# Patient Record
Sex: MALE
[De-identification: names, ages, dates, MRNs, and addresses within clinical notes are randomized per-mention and may not be internally consistent; named-entity substitution may affect disease eponyms.]

---

## 2021-01-13 PROBLEM — Z00.00 ENCOUNTER FOR PREVENTIVE HEALTH EXAMINATION: Status: ACTIVE | Noted: 2021-01-13

## 2021-01-17 ENCOUNTER — APPOINTMENT (OUTPATIENT)
Dept: DISASTER EMERGENCY | Facility: CLINIC | Age: 41
End: 2021-01-17

## 2021-01-17 DIAGNOSIS — Z01.818 ENCOUNTER FOR OTHER PREPROCEDURAL EXAMINATION: ICD-10-CM

## 2021-01-18 LAB — SARS-COV-2 N GENE NPH QL NAA+PROBE: NOT DETECTED

## 2021-01-21 ENCOUNTER — APPOINTMENT (OUTPATIENT)
Dept: PULMONOLOGY | Facility: CLINIC | Age: 41
End: 2021-01-21
Payer: MEDICAID

## 2021-01-21 VITALS
BODY MASS INDEX: 27.57 KG/M2 | HEIGHT: 68.5 IN | HEART RATE: 72 BPM | TEMPERATURE: 97.7 F | WEIGHT: 184 LBS | OXYGEN SATURATION: 98 %

## 2021-01-21 VITALS
BODY MASS INDEX: 27.89 KG/M2 | TEMPERATURE: 97.2 F | RESPIRATION RATE: 15 BRPM | WEIGHT: 184 LBS | DIASTOLIC BLOOD PRESSURE: 90 MMHG | HEART RATE: 86 BPM | HEIGHT: 68 IN | SYSTOLIC BLOOD PRESSURE: 142 MMHG | OXYGEN SATURATION: 98 %

## 2021-01-21 DIAGNOSIS — R06.83 SNORING: ICD-10-CM

## 2021-01-21 DIAGNOSIS — Z78.9 OTHER SPECIFIED HEALTH STATUS: ICD-10-CM

## 2021-01-21 PROCEDURE — 99204 OFFICE O/P NEW MOD 45 MIN: CPT

## 2021-01-21 PROCEDURE — 99072 ADDL SUPL MATRL&STAF TM PHE: CPT

## 2021-01-24 PROBLEM — Z78.9 DOES NOT USE TOBACCO: Status: ACTIVE | Noted: 2021-01-24

## 2021-01-24 PROBLEM — Z78.9 SOCIAL ALCOHOL USE: Status: ACTIVE | Noted: 2021-01-24

## 2021-01-24 RX ORDER — HYDROCORTISONE 25 MG/G
2.5 CREAM TOPICAL
Qty: 30 | Refills: 0 | Status: ACTIVE | COMMUNITY
Start: 2020-12-14

## 2021-01-24 RX ORDER — LACTULOSE 10 G/15ML
10 SOLUTION ORAL
Qty: 450 | Refills: 0 | Status: ACTIVE | COMMUNITY
Start: 2020-12-14

## 2021-01-24 RX ORDER — SIMVASTATIN 20 MG/1
20 TABLET, FILM COATED ORAL
Qty: 30 | Refills: 0 | Status: ACTIVE | COMMUNITY
Start: 2021-01-13

## 2021-01-24 RX ORDER — ERGOCALCIFEROL 1.25 MG/1
1.25 MG CAPSULE, LIQUID FILLED ORAL
Qty: 4 | Refills: 0 | Status: ACTIVE | COMMUNITY
Start: 2020-10-07

## 2021-01-24 RX ORDER — FAMOTIDINE 20 MG/1
20 TABLET, FILM COATED ORAL
Qty: 30 | Refills: 0 | Status: ACTIVE | COMMUNITY
Start: 2021-01-13

## 2021-01-24 NOTE — ASSESSMENT
[FreeTextEntry1] : Mr. Sevilla is a 40 year old male with no significant pmhx who comes in for an evaluation of pulmonary nodule and sleep disordered breathing. \par \par 1. Pulmonary nodule- Patient had CT done at [facility] and images reviewed. 2mm nodule in the RUL with no associated lymphadenopathy. 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 No constitutional symptoms. Will need follow up in 1 year. Patient reassured. \par \par 2. sleep disordered breathing: The patient has multiple signs and symptoms of sleep-disordered breathing include loud snoring, witnessed apnea, nonrestorative sleep, and daytime sleepiness. He was referred to the [facility] for a diagnostic HSAT. The ramifications of ALBERT and its potential therapeutic modalities were discussed with the patient. The patient was cautioned on the importance of avoiding drowsy driving. He will follow up with us after the HSAT.\par

## 2021-01-24 NOTE — REVIEW OF SYSTEMS
[Cough] : cough [Dyspnea] : dyspnea [Negative] : Endocrine [Sputum] : no sputum [A.M. Dry Mouth] : no a.m. dry mouth

## 2021-01-24 NOTE — HISTORY OF PRESENT ILLNESS
[Never] : never [Awakes Unrefreshed] : awakes unrefreshed [Daytime Somnolence] : daytime somnolence [Difficulty Maintaining Sleep] : difficulty maintaining sleep [Frequent Nocturnal Awakening] : frequent nocturnal awakening [Nonrestorative Sleep] : nonrestorative sleep [Snoring] : snoring [TextBox_4] : Mr. Sevilla is a 40 year old male with no significant pmhx who comes in for an evaluation of pulmonary nodules and also for sleep disordered breathing. \par \par 1. Pulmonary nodules- The patient received a CT scan due to cough and nocturnal shortness of breath. Since then his symptoms have resolved. However, his CT scan showed an incidental 2mm nodule in the RUL. \par \par 2. Sleep disordered breathing- the patient complains of loud snoring and witnessed apnea. He also wakes up gasping for air. So he has frequent nocturnal awakenings, and nonrestorative sleep.  [ESS] : 9

## 2021-01-24 NOTE — CONSULT LETTER
[Dear  ___] : Dear  [unfilled], [Please see my note below.] : Please see my note below. [Consult Letter:] : I had the pleasure of evaluating your patient, [unfilled]. [Consult Closing:] : Thank you very much for allowing me to participate in the care of this patient.  If you have any questions, please do not hesitate to contact me. [Sincerely,] : Sincerely, [FreeTextEntry3] : John Rivera DO, MPH\par Pulmonary, Critical Care, and Sleep Medicine\par  of Medicine\par [employer] [facility]

## 2021-01-24 NOTE — PHYSICAL EXAM
[No Acute Distress] : no acute distress [Low Lying Soft Palate] : low lying soft palate [Enlarged Base of the Tongue] : enlarged base of the tongue [IV] : Mallampati Class: IV [Normal Appearance] : normal appearance [No Neck Mass] : no neck mass [Normal Rate/Rhythm] : normal rate/rhythm [Normal S1, S2] : normal s1, s2 [No Murmurs] : no murmurs [No Resp Distress] : no resp distress [Clear to Auscultation Bilaterally] : clear to auscultation bilaterally [No Clubbing] : no clubbing [No Cyanosis] : no cyanosis [No Edema] : no edema [No Focal Deficits] : no focal deficits [Oriented x3] : oriented x3 [Normal Affect] : normal affect

## 2021-02-16 ENCOUNTER — OUTPATIENT (OUTPATIENT)
Dept: OUTPATIENT SERVICES | Facility: HOSPITAL | Age: 41
LOS: 1 days | End: 2021-02-16
Payer: MEDICAID

## 2021-02-16 ENCOUNTER — APPOINTMENT (OUTPATIENT)
Dept: SLEEP CENTER | Facility: CLINIC | Age: 41
End: 2021-02-16
Payer: MEDICAID

## 2021-02-16 PROCEDURE — 95806 SLEEP STUDY UNATT&RESP EFFT: CPT | Mod: 26

## 2021-02-16 PROCEDURE — 95806 SLEEP STUDY UNATT&RESP EFFT: CPT

## 2021-02-22 ENCOUNTER — NON-APPOINTMENT (OUTPATIENT)
Age: 41
End: 2021-02-22

## 2021-02-22 DIAGNOSIS — G47.33 OBSTRUCTIVE SLEEP APNEA (ADULT) (PEDIATRIC): ICD-10-CM

## 2021-02-23 DIAGNOSIS — G47.33 OBSTRUCTIVE SLEEP APNEA (ADULT) (PEDIATRIC): ICD-10-CM
